# Patient Record
Sex: FEMALE | Race: WHITE | Employment: FULL TIME | URBAN - NONMETROPOLITAN AREA
[De-identification: names, ages, dates, MRNs, and addresses within clinical notes are randomized per-mention and may not be internally consistent; named-entity substitution may affect disease eponyms.]

---

## 2024-07-12 ENCOUNTER — HOSPITAL ENCOUNTER (EMERGENCY)
Age: 44
Discharge: HOME | End: 2024-07-12
Attending: EMERGENCY MEDICINE
Payer: COMMERCIAL

## 2024-07-12 VITALS
SYSTOLIC BLOOD PRESSURE: 119 MMHG | BODY MASS INDEX: 23 KG/M2 | HEIGHT: 62 IN | TEMPERATURE: 98.2 F | OXYGEN SATURATION: 99 % | RESPIRATION RATE: 16 BRPM | DIASTOLIC BLOOD PRESSURE: 82 MMHG | WEIGHT: 125 LBS | HEART RATE: 95 BPM

## 2024-07-12 DIAGNOSIS — R00.2 PALPITATIONS: ICD-10-CM

## 2024-07-12 DIAGNOSIS — E16.2 HYPOGLYCEMIA: Primary | ICD-10-CM

## 2024-07-12 DIAGNOSIS — R03.0 ELEVATED BLOOD PRESSURE READING: ICD-10-CM

## 2024-07-12 DIAGNOSIS — F41.1 ANXIETY STATE: ICD-10-CM

## 2024-07-12 LAB
ALBUMIN SERPL-MCNC: 4 G/DL (ref 3.4–5)
ALBUMIN/GLOB SERPL: 1.4 {RATIO} (ref 1.1–2.2)
ALP SERPL-CCNC: 37 U/L (ref 40–129)
ALT SERPL-CCNC: 15 U/L (ref 10–40)
ANION GAP SERPL CALCULATED.3IONS-SCNC: 12 MMOL/L (ref 3–16)
AST SERPL-CCNC: 21 U/L (ref 15–37)
BASOPHILS # BLD: 0 K/UL (ref 0–0.2)
BASOPHILS NFR BLD: 0.8 %
BILIRUB SERPL-MCNC: <0.2 MG/DL (ref 0–1)
BUN SERPL-MCNC: 17 MG/DL (ref 7–20)
CALCIUM SERPL-MCNC: 9.2 MG/DL (ref 8.3–10.6)
CHLORIDE SERPL-SCNC: 103 MMOL/L (ref 99–110)
CO2 SERPL-SCNC: 23 MMOL/L (ref 21–32)
CREAT SERPL-MCNC: 0.7 MG/DL (ref 0.6–1.1)
D-DIMER QUANTITATIVE: <0.27 UG/ML FEU (ref 0–0.6)
DEPRECATED RDW RBC AUTO: 12.8 % (ref 12.4–15.4)
EKG ATRIAL RATE: 95 BPM
EKG DIAGNOSIS: NORMAL
EKG P AXIS: 53 DEGREES
EKG P-R INTERVAL: 150 MS
EKG Q-T INTERVAL: 354 MS
EKG QRS DURATION: 74 MS
EKG QTC CALCULATION (BAZETT): 444 MS
EKG R AXIS: 62 DEGREES
EKG T AXIS: 39 DEGREES
EKG VENTRICULAR RATE: 95 BPM
EOSINOPHIL # BLD: 0.1 K/UL (ref 0–0.6)
EOSINOPHIL NFR BLD: 1.7 %
GFR SERPLBLD CREATININE-BSD FMLA CKD-EPI: >90 ML/MIN/{1.73_M2}
GLUCOSE BLD-MCNC: 104 MG/DL (ref 70–99)
GLUCOSE BLD-MCNC: 129 MG/DL (ref 70–99)
GLUCOSE SERPL-MCNC: 115 MG/DL (ref 70–99)
HCG SERPL QL: NEGATIVE
HCT VFR BLD AUTO: 36.2 % (ref 36–48)
HGB BLD-MCNC: 12.3 G/DL (ref 12–16)
LACTATE BLDV-SCNC: 1.3 MMOL/L (ref 0.4–2)
LYMPHOCYTES # BLD: 1.6 K/UL (ref 1–5.1)
LYMPHOCYTES NFR BLD: 32.4 %
MAGNESIUM SERPL-MCNC: 2.1 MG/DL (ref 1.8–2.4)
MCH RBC QN AUTO: 30.7 PG (ref 26–34)
MCHC RBC AUTO-ENTMCNC: 33.9 G/DL (ref 31–36)
MCV RBC AUTO: 90.4 FL (ref 80–100)
MONOCYTES # BLD: 0.5 K/UL (ref 0–1.3)
MONOCYTES NFR BLD: 9.9 %
NEUTROPHILS # BLD: 2.7 K/UL (ref 1.7–7.7)
NEUTROPHILS NFR BLD: 55.2 %
PERFORMED ON: ABNORMAL
PERFORMED ON: ABNORMAL
PLATELET # BLD AUTO: 378 K/UL (ref 135–450)
PMV BLD AUTO: 6.9 FL (ref 5–10.5)
POTASSIUM SERPL-SCNC: 4.1 MMOL/L (ref 3.5–5.1)
PROT SERPL-MCNC: 6.9 G/DL (ref 6.4–8.2)
RBC # BLD AUTO: 4.01 M/UL (ref 4–5.2)
SODIUM SERPL-SCNC: 138 MMOL/L (ref 136–145)
TROPONIN, HIGH SENSITIVITY: <6 NG/L (ref 0–14)
WBC # BLD AUTO: 4.9 K/UL (ref 4–11)

## 2024-07-12 PROCEDURE — 36415 COLL VENOUS BLD VENIPUNCTURE: CPT

## 2024-07-12 PROCEDURE — 99284 EMERGENCY DEPT VISIT MOD MDM: CPT

## 2024-07-12 PROCEDURE — 2580000003 HC RX 258: Performed by: EMERGENCY MEDICINE

## 2024-07-12 PROCEDURE — 85025 COMPLETE CBC W/AUTO DIFF WBC: CPT

## 2024-07-12 PROCEDURE — 83735 ASSAY OF MAGNESIUM: CPT

## 2024-07-12 PROCEDURE — 85379 FIBRIN DEGRADATION QUANT: CPT

## 2024-07-12 PROCEDURE — 84703 CHORIONIC GONADOTROPIN ASSAY: CPT

## 2024-07-12 PROCEDURE — 93010 ELECTROCARDIOGRAM REPORT: CPT | Performed by: INTERNAL MEDICINE

## 2024-07-12 PROCEDURE — 96360 HYDRATION IV INFUSION INIT: CPT

## 2024-07-12 PROCEDURE — 83605 ASSAY OF LACTIC ACID: CPT

## 2024-07-12 PROCEDURE — 93005 ELECTROCARDIOGRAM TRACING: CPT | Performed by: EMERGENCY MEDICINE

## 2024-07-12 PROCEDURE — 80053 COMPREHEN METABOLIC PANEL: CPT

## 2024-07-12 PROCEDURE — 84484 ASSAY OF TROPONIN QUANT: CPT

## 2024-07-12 RX ORDER — M-VIT,TX,IRON,MINS/CALC/FOLIC 27MG-0.4MG
1 TABLET ORAL DAILY
COMMUNITY

## 2024-07-12 RX ORDER — 0.9 % SODIUM CHLORIDE 0.9 %
1000 INTRAVENOUS SOLUTION INTRAVENOUS ONCE
Status: COMPLETED | OUTPATIENT
Start: 2024-07-12 | End: 2024-07-12

## 2024-07-12 RX ORDER — METHYLPHENIDATE HYDROCHLORIDE 5 MG/1
5 TABLET ORAL 2 TIMES DAILY
COMMUNITY

## 2024-07-12 RX ADMIN — SODIUM CHLORIDE 1000 ML: 9 INJECTION, SOLUTION INTRAVENOUS at 01:45

## 2024-07-12 ASSESSMENT — PAIN SCALES - GENERAL
PAINLEVEL_OUTOF10: 0
PAINLEVEL_OUTOF10: 0

## 2024-07-12 ASSESSMENT — PAIN - FUNCTIONAL ASSESSMENT
PAIN_FUNCTIONAL_ASSESSMENT: 0-10
PAIN_FUNCTIONAL_ASSESSMENT: 0-10

## 2024-07-12 ASSESSMENT — ENCOUNTER SYMPTOMS
SHORTNESS OF BREATH: 0
DIARRHEA: 0
VOMITING: 0
ABDOMINAL PAIN: 0

## 2024-07-12 NOTE — DISCHARGE INSTRUCTIONS
Stay hydrated.  Talk to your primary doctor about potentially switching from Trintellix since this can cause hypoglycemia.  If you have symptoms like this in the future, you were smart to do what you did this evening by eating a sugary snack.      Return to the emergency department for any return of low glucose, development of chest pain or shortness of breath, new fever or concern for infection, or any other concerns over the next 6 to 24 hours.    Follow-up with your primary doctor in 2 to 4 days for further assessment.

## 2024-07-12 NOTE — ED PROVIDER NOTES
[facility] EMERGENCY DEPARTMENT  EMERGENCY DEPARTMENT ENCOUNTER        Pt Name: Sarahi Cosme  MRN: [id number]  Birthdate 1980  Date of evaluation: 7/12/2024  Provider: Rodriguez Casas MD  PCP: Lefty López, APRN - CNP      CHIEF COMPLAINT       Chief Complaint   Patient presents with    Hypoglycemia     Pt reports that she is pre-diabetic, she felt shaky and tachycardic. Pt checked her BS her meter read \"low.\" Pt took apple juice and had candy bar prior to arrival.  during triage       HISTORY OFPRESENT ILLNESS   (Location/Symptom, Timing/Onset, Context/Setting, Quality, Duration, Modifying Factors,Severity)  Note limiting factors.     Sarahi Cosme is a 43 y.o. female presenting today due to concern for starting to not feel well around 12:30 AM this morning about 1 hour prior to me seeing her associated with feeling cold all over and shaky.  She also felt like her heart was racing.  She checked her glucose after googling her symptoms and ultimately her meter read low which she has never seen before.  She states she has had somewhat low blood sugars in the past but never had a reading where it just read low multiple times.  She does feel anxious right now but denies any reason to be more anxious this evening.  She did drink some apple juice and had a candy bar after checking her sugar and by the time she got to the emergency department her glucose measured 129.  She denies any chest pain or shortness of breath.  No abdominal pain or vomiting or diarrhea.  No fever.  She is on oral birth control.  She is not on any medications that would cause low blood sugar.  She states she tries to avoid carbohydrates when possible.  She ate around 6 PM and had a normal meal.  Due to concern for hypoglycemia, she came to the emergency department for further evaluation.  She denied any trouble walking back to her emergency medicine bed prior to me seeing her and denies feeling lightheaded or dizzy  currently.    She does report starting Trintellix over the last month and that is the only new change in medications.    REVIEW OF SYSTEMS    (2-9 systems for level 4, 10 or more for level 5)     Review of Systems   Constitutional:  Positive for chills and fatigue. Negative for appetite change, fever and unexpected weight change.   Respiratory:  Negative for shortness of breath.    Cardiovascular:  Positive for palpitations. Negative for chest pain.   Gastrointestinal:  Negative for abdominal pain, diarrhea and vomiting.   Genitourinary:  Negative for vaginal bleeding (Last menstrual cycle was 2 weeks ago).   Musculoskeletal:  Negative for gait problem.   Neurological:  Negative for dizziness, syncope, weakness, light-headedness and numbness.   Psychiatric/Behavioral:  Negative for suicidal ideas. The patient is nervous/anxious.          Positives and Pertinent negatives as per HPI.      PASTMEDICAL HISTORY     Past Medical History:   Diagnosis Date    Abnormal Pap smear of cervix     Advanced maternal age in multigravida     37 yo at time of delivery    Anemia     takes iron BID    Diabetes mellitus (HCC)     gestational, diet controlled    History of blood transfusion     Infertility, female     Pre-eclampsia     with G1    Urinary tract infection     reoccuring         SURGICAL HISTORY       Past Surgical History:   Procedure Laterality Date     SECTION N/A 3/9/2019     SECTION performed by Sabas Ricketts MD at [facility] L&D OR    CHOLECYSTECTOMY, LAPAROSCOPIC N/A 2020    LAPAROSCOPIC CHOLECYSTECTOMY WITH INTRAOPERATIVE CHOLANGIOGRAM performed by Sebas Colorado MD at [facility] OR    OTHER SURGICAL HISTORY      surgery for uterine infection postop delivery of child; 21 years ago    WISDOM TOOTH EXTRACTION           CURRENT MEDICATIONS       Discharge Medication List as of 2024  2:49 AM        CONTINUE these medications which have NOT CHANGED    Details   Multiple Vitamins-Minerals

## 2025-06-24 ENCOUNTER — APPOINTMENT (OUTPATIENT)
Dept: GENERAL RADIOLOGY | Age: 45
End: 2025-06-24
Payer: COMMERCIAL

## 2025-06-24 ENCOUNTER — HOSPITAL ENCOUNTER (EMERGENCY)
Age: 45
Discharge: HOME | End: 2025-06-24
Payer: COMMERCIAL

## 2025-06-24 VITALS
HEIGHT: 62 IN | SYSTOLIC BLOOD PRESSURE: 115 MMHG | WEIGHT: 150 LBS | BODY MASS INDEX: 27.6 KG/M2 | OXYGEN SATURATION: 98 % | TEMPERATURE: 98 F | RESPIRATION RATE: 18 BRPM | DIASTOLIC BLOOD PRESSURE: 68 MMHG | HEART RATE: 111 BPM

## 2025-06-24 DIAGNOSIS — S82.61XA CLOSED AVULSION FRACTURE OF LATERAL MALLEOLUS OF RIGHT FIBULA, INITIAL ENCOUNTER: Primary | ICD-10-CM

## 2025-06-24 PROCEDURE — 99283 EMERGENCY DEPT VISIT LOW MDM: CPT

## 2025-06-24 PROCEDURE — 73610 X-RAY EXAM OF ANKLE: CPT

## 2025-06-24 ASSESSMENT — PAIN - FUNCTIONAL ASSESSMENT: PAIN_FUNCTIONAL_ASSESSMENT: 0-10

## 2025-06-24 ASSESSMENT — PAIN SCALES - GENERAL: PAINLEVEL_OUTOF10: 5

## 2025-06-24 NOTE — DISCHARGE INSTRUCTIONS
You were seen today for a right ankle injury.  Your x-ray did show a avulsion fracture of the lateral malleolus.  You were placed in a walking boot provided with crutches.  A referral was given to you for orthopedics, please remain nonweightbearing and to follow-up with orthopedics for reevaluation.  Please rest, ice, and elevate the affected foot.  You can take ibuprofen 600 mg every 6 hours and Tylenol 1000 mg every 8 hours as needed for symptom relief.  Return as department for any new or worsening symptoms including uncontrolled pain with medication, increased swelling, numbness.

## 2025-06-24 NOTE — ED NOTES
Discharge paperwork given to and reviewed with pt. Pt verbalized understanding and all questions answered. Pt encouraged to return if having worsening symptoms or new symptoms discussed in discharge paperwork.  Pt to follow up with Ortho   Pt in NAD, RR even and unlabored. Pt off unit ambulatory with crutches

## 2025-06-24 NOTE — ED PROVIDER NOTES
[facility] EMERGENCY DEPARTMENT  EMERGENCY DEPARTMENT ENCOUNTER        Pt Name: Sarahi Cosme  MRN: [id number]  Birthdate 1980  Date of evaluation: 2025  Provider: RAMONA Wise  PCP: Lefty López, APRN - CNP  Note Started: 3:37 PM EDT 25      ELIZABETH. I have evaluated this patient.        CHIEF COMPLAINT       Chief Complaint   Patient presents with    Ankle Pain     Pt reports right ankle pain after getting out of work van and rolling ankle on curb today       HISTORY OF PRESENT ILLNESS: 1 or more Elements     History From: Patient     Limitations to history : None    Social Determinants Significantly Affecting Health : None    Chief Complaint:Ankle pain     Sarahi Cosme is a 44 y.o. female who presents to the emergency department for right ankle pain.  States that she was stepping out of her work truck when she rolled her ankle on the curb.  She has been able to ambulate since then however pain is getting progressively worse.  Pain is on the lateral aspect of the ankle.  She denies numbness or tingling.  She has not tried taking over-the-counter medications for pain.  Rates her pain a 5 out of 10.    Nursing Notes were all reviewed and agreed with or any disagreements were addressed in the HPI.    REVIEW OF SYSTEMS :      Review of Systems    Positives and Pertinent negatives as per HPI.     SURGICAL HISTORY     Past Surgical History:   Procedure Laterality Date     SECTION N/A 3/9/2019     SECTION performed by Sabas Ricketts MD at [facility] L&D OR    CHOLECYSTECTOMY, LAPAROSCOPIC N/A 2020    LAPAROSCOPIC CHOLECYSTECTOMY WITH INTRAOPERATIVE CHOLANGIOGRAM performed by Sebas Colorado MD at [facility] OR    OTHER SURGICAL HISTORY      surgery for uterine infection postop delivery of child; 21 years ago    WISDOM TOOTH EXTRACTION         CURRENTMEDICATIONS       Discharge Medication List as of 2025  4:41 PM        CONTINUE these medications which have NOT

## 2025-06-25 ENCOUNTER — TELEPHONE (OUTPATIENT)
Dept: ORTHOPEDIC SURGERY | Age: 45
End: 2025-06-25

## 2025-06-25 NOTE — TELEPHONE ENCOUNTER
Spoke with Pt to schedule ED follow up appt. Pt was provided with time, date and location. Pt expressed understanding

## 2025-06-25 NOTE — TELEPHONE ENCOUNTER
----- Message from Dr. Sathya Rouse MD sent at 6/25/2025  7:17 AM EDT -----  Schedule for tomorrow 6/26/25  ----- Message -----  From: Discharge Provider, Automatic  Sent: 6/25/2025   4:47 AM EDT  To: Sathya Rouse MD

## 2025-06-26 ENCOUNTER — OFFICE VISIT (OUTPATIENT)
Dept: ORTHOPEDIC SURGERY | Age: 45
End: 2025-06-26
Payer: COMMERCIAL

## 2025-06-26 VITALS — BODY MASS INDEX: 27.6 KG/M2 | WEIGHT: 150 LBS | HEIGHT: 62 IN

## 2025-06-26 DIAGNOSIS — S82.831A CLOSED FRACTURE OF DISTAL END OF RIGHT FIBULA, UNSPECIFIED FRACTURE MORPHOLOGY, INITIAL ENCOUNTER: Primary | ICD-10-CM

## 2025-06-26 PROCEDURE — 99204 OFFICE O/P NEW MOD 45 MIN: CPT | Performed by: ORTHOPAEDIC SURGERY

## 2025-06-26 PROCEDURE — G8419 CALC BMI OUT NRM PARAM NOF/U: HCPCS | Performed by: ORTHOPAEDIC SURGERY

## 2025-06-26 PROCEDURE — G8427 DOCREV CUR MEDS BY ELIG CLIN: HCPCS | Performed by: ORTHOPAEDIC SURGERY

## 2025-06-26 PROCEDURE — 4004F PT TOBACCO SCREEN RCVD TLK: CPT | Performed by: ORTHOPAEDIC SURGERY

## 2025-06-26 RX ORDER — MELOXICAM 15 MG/1
15 TABLET ORAL DAILY PRN
Qty: 30 TABLET | Refills: 0 | Status: SHIPPED | OUTPATIENT
Start: 2025-06-26

## 2025-06-26 NOTE — PROGRESS NOTES
ORTHOPAEDIC SURGERY H&P / CONSULTATION NOTE    Chief complaint:   Chief Complaint   Patient presents with    Ankle Pain     NP R ANKLE      History of present illness: The patient is a 44 y.o. female with subjective symptoms of right ankle pain. The chief complaint is located at anterolateral aspect/lateral aspect of the ankle. Duration of symptoms has been for 2 days. The severity of symptoms is rated at 4/10 pain but can be as high as 6/10 pain on intake form.  Patient states she stepped out of a truck on 2025 and rolled her ankle.  She went to the ER on 2025.  She states she is noticed swelling.  She was provided a boot and crutches.  She has been taking over-the-counter anti-inflammatory/medications with mild relief.  Denies therapy or injections.  States no significant injuries to the ankle previously.  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     +   Over-the-counter anti-inflammatories/prescription medication anti-inflammatory.     -   Physical therapy / guided home exercise program -     -   Previous corticosteroid injections    Past medical history:    Past Medical History:   Diagnosis Date    Abnormal Pap smear of cervix     Advanced maternal age in multigravida     37 yo at time of delivery    Anemia     takes iron BID    Diabetes mellitus (HCC)     gestational, diet controlled    History of blood transfusion     Infertility, female     Pre-eclampsia     with G1    Urinary tract infection     reoccuring        Past surgical history:    Past Surgical History:   Procedure Laterality Date     SECTION N/A 3/9/2019     SECTION performed by Sabas Ricketts MD at [facility] L&D OR    CHOLECYSTECTOMY, LAPAROSCOPIC N/A 2020    LAPAROSCOPIC CHOLECYSTECTOMY WITH INTRAOPERATIVE CHOLANGIOGRAM performed by Sebas Canas

## 2025-07-01 ENCOUNTER — TELEPHONE (OUTPATIENT)
Dept: ORTHOPEDIC SURGERY | Age: 45
End: 2025-07-01

## 2025-07-01 NOTE — TELEPHONE ENCOUNTER
CALLED TO SPEAK WITH PATIEN IN REGARDS TO COLLECTING WC INFORMATION BUT WAS UNSUCCESSFUL. UNABLE TO LEAVE ,  IS FULL.

## 2025-07-03 ENCOUNTER — OFFICE VISIT (OUTPATIENT)
Dept: ORTHOPEDIC SURGERY | Age: 45
End: 2025-07-03
Payer: COMMERCIAL

## 2025-07-03 VITALS — HEIGHT: 62 IN | BODY MASS INDEX: 27.6 KG/M2 | WEIGHT: 150 LBS

## 2025-07-03 DIAGNOSIS — E55.9 VITAMIN D DEFICIENCY: ICD-10-CM

## 2025-07-03 DIAGNOSIS — S82.831D CLOSED FRACTURE OF DISTAL END OF RIGHT FIBULA WITH ROUTINE HEALING, UNSPECIFIED FRACTURE MORPHOLOGY, SUBSEQUENT ENCOUNTER: Primary | ICD-10-CM

## 2025-07-03 LAB — 25(OH)D3 SERPL-MCNC: 50.2 NG/ML

## 2025-07-03 PROCEDURE — 4004F PT TOBACCO SCREEN RCVD TLK: CPT | Performed by: ORTHOPAEDIC SURGERY

## 2025-07-03 PROCEDURE — 99213 OFFICE O/P EST LOW 20 MIN: CPT | Performed by: ORTHOPAEDIC SURGERY

## 2025-07-03 PROCEDURE — G8419 CALC BMI OUT NRM PARAM NOF/U: HCPCS | Performed by: ORTHOPAEDIC SURGERY

## 2025-07-03 PROCEDURE — G8427 DOCREV CUR MEDS BY ELIG CLIN: HCPCS | Performed by: ORTHOPAEDIC SURGERY

## 2025-07-08 ENCOUNTER — RESULTS FOLLOW-UP (OUTPATIENT)
Dept: ORTHOPEDIC SURGERY | Age: 45
End: 2025-07-08

## 2025-07-08 NOTE — TELEPHONE ENCOUNTER
----- Message from Dr. Sathya Rouse MD sent at 7/6/2025 10:44 AM EDT -----  Regarding: FW:  Please contact patient.  Normal lab work  ----- Message -----  From: Maura Incoming Lab Results From Soft (Epic Adt)  Sent: 7/3/2025   9:45 PM EDT  To: Sathya Rouse MD

## 2025-07-10 NOTE — TELEPHONE ENCOUNTER
Spoke with Pt to provide normal Vit D TR and no need for Vit D supplementation at this time. Recommended for diet rich in calcium. Pt expressed understanding

## 2025-07-14 ENCOUNTER — TELEPHONE (OUTPATIENT)
Dept: ORTHOPEDIC SURGERY | Age: 45
End: 2025-07-14

## 2025-07-14 NOTE — TELEPHONE ENCOUNTER
Attempted to contact Pt to inform them of the approval of the C9 for physical therapy. M for Pt to call back at [phone]

## 2025-07-16 ENCOUNTER — TELEPHONE (OUTPATIENT)
Dept: ORTHOPEDIC SURGERY | Age: 45
End: 2025-07-16

## 2025-07-16 NOTE — TELEPHONE ENCOUNTER
Attempted to call Pt to let her know that her C9 for physical therapy had been approved. Did not answer so LVM relaying and asked that she call back at [phone] if she had any questions.

## 2025-07-17 ENCOUNTER — TELEPHONE (OUTPATIENT)
Dept: ORTHOPEDIC SURGERY | Age: 45
End: 2025-07-17

## 2025-07-17 NOTE — TELEPHONE ENCOUNTER
Attempted to contact Pt to inform them of C9 approval for physical therapy. LVM for Pt to call back at [phone]

## 2025-07-21 DIAGNOSIS — S82.831A CLOSED FRACTURE OF DISTAL END OF RIGHT FIBULA, UNSPECIFIED FRACTURE MORPHOLOGY, INITIAL ENCOUNTER: ICD-10-CM

## 2025-07-21 RX ORDER — MELOXICAM 15 MG/1
TABLET ORAL
Qty: 30 TABLET | Refills: 0 | Status: SHIPPED | OUTPATIENT
Start: 2025-07-21

## 2025-07-30 ENCOUNTER — HOSPITAL ENCOUNTER (OUTPATIENT)
Dept: PHYSICAL THERAPY | Age: 45
Setting detail: THERAPIES SERIES
Discharge: HOME | End: 2025-07-30
Payer: COMMERCIAL

## 2025-07-30 DIAGNOSIS — R26.89 ANTALGIC GAIT: ICD-10-CM

## 2025-07-30 DIAGNOSIS — S82.821A CLOSED TORUS FRACTURE OF DISTAL END OF RIGHT FIBULA, INITIAL ENCOUNTER: ICD-10-CM

## 2025-07-30 DIAGNOSIS — M25.571 ACUTE RIGHT ANKLE PAIN: Primary | ICD-10-CM

## 2025-07-30 PROCEDURE — 97140 MANUAL THERAPY 1/> REGIONS: CPT

## 2025-07-30 PROCEDURE — 97161 PT EVAL LOW COMPLEX 20 MIN: CPT

## 2025-07-30 PROCEDURE — 97110 THERAPEUTIC EXERCISES: CPT

## 2025-07-30 NOTE — PLAN OF CARE
antalgic pattern, decreased lópez, lack heel strike on right, decreased step height on right, decreased step length on right, and decreased stance time on right  Assistive Device Used: walking boot    Balance:   NT    Falls Risk Assessment (30 days):   Falls Risk assessed and no intervention required.  Time Up and Go (TUG):   Not Assessed        Exercises/Interventions     Therapeutic Ex (19133)   NMR re-education (07894) resistance Sets/time Reps Notes/Cues/Progressions          Long Sit Gastroc Stretch   30\" 3 x     Long Sit Bent Knee Soleus stretch  30\" 3 x            Long Sit PF/DF   20 x ea    Long Sit Ever/Inver   20 x ea           Seated Towel Toe Curls   30 x     Seated PF/DF  5\" 20 x ea    Seated Ever/Inver   20 x ea           1/2 Moon PF/DF Stretch  10\" 15 x                                               Manual Intervention (20383)  TIME     Metetarsal Mobs  5'     PROM Ankle  7'     Ankle Mobs   5'     Manual Great Toe Stretching  3'                   Therapeutic Activity (17416)  Sets/time                                          Modalities:    No modalities applied this session    Education/Home Exercise Program: Patient HEP program created electronically.  Refer to [employer] access code: Access Code: [email or web address]  Access Code: [email or web address]  URL: [email or web address]  Date: 07/30/2025  Prepared by: Melissa Altman    Exercises  - Long Sitting Calf Stretch with Strap  - 1 x daily - 7 x weekly - 1 sets - 5 reps - 30 hold  - Long Sitting Soleus Stretch on Bolster with Strap  - 1 x daily - 7 x weekly - 1 sets - 3 reps - 30\" hold  - Seated Table Hamstring Stretch  - 1 x daily - 7 x weekly - 1 sets - 5 reps - 30 hold  - Seated Toe Raise  - 1 x daily - 7 x weekly - 1 sets - 15 reps - 5\" hold  - Seated Heel Raise  - 1 x daily - 7 x weekly - 1 sets - 15 reps - 5\" hold  - Ankle Inversion Eversion Towel Slide  - 1 x daily - 7 x weekly - 2 sets - 15 reps  - Towel Scrunches  - 1 x daily - 7 x weekly - 1 sets - 30

## 2025-07-31 ENCOUNTER — OFFICE VISIT (OUTPATIENT)
Dept: ORTHOPEDIC SURGERY | Age: 45
End: 2025-07-31

## 2025-07-31 VITALS — HEIGHT: 62 IN | BODY MASS INDEX: 27.6 KG/M2 | WEIGHT: 150 LBS

## 2025-07-31 DIAGNOSIS — S82.831D CLOSED FRACTURE OF DISTAL END OF RIGHT FIBULA WITH ROUTINE HEALING, UNSPECIFIED FRACTURE MORPHOLOGY, SUBSEQUENT ENCOUNTER: Primary | ICD-10-CM

## 2025-07-31 NOTE — PROGRESS NOTES
FOLLOW UP ORTHOPAEDIC NOTE    The patient follows up today for re-evaluation of right ankle. The patient states 1/10 pain.  She just darted therapy.  She has been walking around in the boot.  She denies significant pain.    PE:  AAOx3  RR  Unlabored breathing  Skin warm and moist  Focused physical examination of the right ankle  NTTP distal lateral malleolus  Resolved swelling  Skin intact throughout  5/5 IP Q H TA G EHL  SILT DP SP LP MP S S  +2 DP pulse    Pertinent radiographs/imaging:  3V R ankle 7/31/2025: Healed distal fibula fracture     Diagnosis Orders   1. Closed fracture of distal end of right fibula with routine healing, unspecified fracture morphology, subsequent encounter  XR ANKLE RIGHT (MIN 3 VIEWS)        Assessment and plan: 44 female with continued subjective symptoms of right ankle pain with known, correlating diagnosis of right distal fibula fracture.  -Time of 12 minutes was spent coordinating and discussing the clinical findings and diagnostic imaging results as they pertain to the patient's presenting subjective symptoms.  - I had a pleasant discussion with the patient today.  I reviewed with the patient that currently her clinical examination is improving and her radiographs show healed distal fibula fracture  - She will transition out of the boot at this time to regular shoe use however I recommended a supportive pair of shoes  - She will continue to work on agustín-ankle reconditioning and strengthening to include pain modalities with formal physical therapy for the next several sessions  - OTC Tylenol/anti-inflammatories previously reviewed and discussed  - I did review with her overall expectation management as she continues to recover  -Note was provided for work stating may return to work without limitations  -All questions answered to the patient's satisfaction and the patient expressed understanding and agreement with the above listed treatment plan  -Follow up in as needed  -Thank you

## 2025-08-01 ENCOUNTER — HOSPITAL ENCOUNTER (OUTPATIENT)
Dept: PHYSICAL THERAPY | Age: 45
Setting detail: THERAPIES SERIES
Discharge: HOME | End: 2025-08-01
Payer: COMMERCIAL

## 2025-08-01 PROCEDURE — 97140 MANUAL THERAPY 1/> REGIONS: CPT

## 2025-08-01 PROCEDURE — 97110 THERAPEUTIC EXERCISES: CPT

## 2025-08-01 PROCEDURE — 97112 NEUROMUSCULAR REEDUCATION: CPT

## 2025-08-01 NOTE — FLOWSHEET NOTE
[facility] - Outpatient Rehabilitation and Therapy: [address], [place] [address] office: [phone] fax: [phone]    Physical Therapy: TREATMENT/PROGRESS NOTE   Patient: Sarahi Cosme (44 y.o. female)   Examination Date: 2025   :  1980 MRN: [id number]   Visit #: 2   Insurance Allowable Auth Needed   12 visits  2-3 x wk for 4-6 wks (25 end date) [x]Yes    []No    Insurance: Payor: [employer] (Inspect Manager)O / Plan: CMP.LY MCO / Product Type: *No Product type* /   Insurance ID: [id number] - (Worker's Comp)  Secondary Insurance (if applicable):    Treatment Diagnosis:     ICD-10-CM    1. Acute right ankle pain  M25.571       2. Antalgic gait  R26.89       3. Closed torus fracture of distal end of right fibula, initial encounter  S82.821A          Medical Diagnosis: S82.831A Closed torus fracture of distal end of right fibula, initial encounter      Referring Physician: Sathya Rouse MD  PCP: Lefty López, APRN - CNP     Plan of care signed (Y/N):     Date of Patient follow up with Physician:      Plan of Care Report: NO  POC update due: (10 visits /OR AUTH LIMITS, whichever is less)  2025                                             Medical History:  Comorbidities:  Anxiety  Depression  Relevant Medical History:   Past Medical History:   Diagnosis Date    Abnormal Pap smear of cervix     Advanced maternal age in multigravida     39 yo at time of delivery    Anemia     takes iron BID    Diabetes mellitus (HCC)     gestational, diet controlled    History of blood transfusion     Infertility, female     Pre-eclampsia     with G1    Urinary tract infection     reoccuring                                              Precautions/ Contra-indications:           Latex allergy:  NO  Pacemaker:    NO  Contraindications for Manipulation: recent fracture  Date of Surgery: n/a  Other:    Red Flags:  None    Suicide Screening:   The patient did not verbalize a primary

## 2025-08-05 ENCOUNTER — HOSPITAL ENCOUNTER (OUTPATIENT)
Dept: PHYSICAL THERAPY | Age: 45
Setting detail: THERAPIES SERIES
Discharge: HOME | End: 2025-08-05
Payer: COMMERCIAL

## 2025-08-05 PROCEDURE — 97112 NEUROMUSCULAR REEDUCATION: CPT

## 2025-08-05 PROCEDURE — 97110 THERAPEUTIC EXERCISES: CPT

## 2025-08-05 PROCEDURE — 97140 MANUAL THERAPY 1/> REGIONS: CPT

## 2025-08-08 ENCOUNTER — HOSPITAL ENCOUNTER (OUTPATIENT)
Dept: PHYSICAL THERAPY | Age: 45
Setting detail: THERAPIES SERIES
Discharge: HOME | End: 2025-08-08
Payer: COMMERCIAL

## 2025-08-08 PROCEDURE — 97110 THERAPEUTIC EXERCISES: CPT

## 2025-08-08 PROCEDURE — 97112 NEUROMUSCULAR REEDUCATION: CPT

## 2025-08-08 PROCEDURE — 97140 MANUAL THERAPY 1/> REGIONS: CPT

## 2025-08-12 ENCOUNTER — HOSPITAL ENCOUNTER (OUTPATIENT)
Dept: PHYSICAL THERAPY | Age: 45
Setting detail: THERAPIES SERIES
Discharge: HOME | End: 2025-08-12
Payer: COMMERCIAL

## 2025-08-12 PROCEDURE — 97112 NEUROMUSCULAR REEDUCATION: CPT

## 2025-08-12 PROCEDURE — 97110 THERAPEUTIC EXERCISES: CPT

## 2025-08-12 PROCEDURE — 97161 PT EVAL LOW COMPLEX 20 MIN: CPT

## 2025-08-12 PROCEDURE — 97140 MANUAL THERAPY 1/> REGIONS: CPT

## 2025-08-14 ENCOUNTER — HOSPITAL ENCOUNTER (OUTPATIENT)
Dept: PHYSICAL THERAPY | Age: 45
Setting detail: THERAPIES SERIES
Discharge: HOME | End: 2025-08-14
Payer: COMMERCIAL

## 2025-08-14 PROCEDURE — 97112 NEUROMUSCULAR REEDUCATION: CPT

## 2025-08-14 PROCEDURE — 97140 MANUAL THERAPY 1/> REGIONS: CPT

## 2025-08-14 PROCEDURE — 97110 THERAPEUTIC EXERCISES: CPT

## 2025-08-19 ENCOUNTER — HOSPITAL ENCOUNTER (OUTPATIENT)
Dept: PHYSICAL THERAPY | Age: 45
Setting detail: THERAPIES SERIES
Discharge: HOME | End: 2025-08-19
Payer: COMMERCIAL

## 2025-08-19 ENCOUNTER — PATIENT MESSAGE (OUTPATIENT)
Dept: ORTHOPEDIC SURGERY | Age: 45
End: 2025-08-19

## 2025-08-19 PROCEDURE — 97140 MANUAL THERAPY 1/> REGIONS: CPT

## 2025-08-19 PROCEDURE — 97112 NEUROMUSCULAR REEDUCATION: CPT

## 2025-08-19 PROCEDURE — 97110 THERAPEUTIC EXERCISES: CPT

## 2025-08-22 ENCOUNTER — HOSPITAL ENCOUNTER (OUTPATIENT)
Dept: PHYSICAL THERAPY | Age: 45
Setting detail: THERAPIES SERIES
Discharge: HOME | End: 2025-08-22
Payer: COMMERCIAL

## 2025-08-22 PROCEDURE — 97140 MANUAL THERAPY 1/> REGIONS: CPT

## 2025-08-22 PROCEDURE — 97110 THERAPEUTIC EXERCISES: CPT

## 2025-08-22 PROCEDURE — 97112 NEUROMUSCULAR REEDUCATION: CPT

## 2025-08-26 ENCOUNTER — HOSPITAL ENCOUNTER (OUTPATIENT)
Dept: PHYSICAL THERAPY | Age: 45
Setting detail: THERAPIES SERIES
Discharge: HOME | End: 2025-08-26
Payer: COMMERCIAL

## 2025-08-26 PROCEDURE — 97112 NEUROMUSCULAR REEDUCATION: CPT

## 2025-08-26 PROCEDURE — 97110 THERAPEUTIC EXERCISES: CPT

## 2025-08-26 PROCEDURE — 97140 MANUAL THERAPY 1/> REGIONS: CPT

## 2025-08-28 ENCOUNTER — HOSPITAL ENCOUNTER (OUTPATIENT)
Dept: PHYSICAL THERAPY | Age: 45
Setting detail: THERAPIES SERIES
Discharge: HOME | End: 2025-08-28
Payer: COMMERCIAL

## 2025-08-28 ENCOUNTER — OFFICE VISIT (OUTPATIENT)
Dept: ORTHOPEDIC SURGERY | Age: 45
End: 2025-08-28

## 2025-08-28 VITALS — BODY MASS INDEX: 27.6 KG/M2 | WEIGHT: 150 LBS | HEIGHT: 62 IN

## 2025-08-28 DIAGNOSIS — S82.831D CLOSED FRACTURE OF DISTAL END OF RIGHT FIBULA WITH ROUTINE HEALING, UNSPECIFIED FRACTURE MORPHOLOGY, SUBSEQUENT ENCOUNTER: Primary | ICD-10-CM

## 2025-08-28 PROCEDURE — 97140 MANUAL THERAPY 1/> REGIONS: CPT

## 2025-08-28 PROCEDURE — 97112 NEUROMUSCULAR REEDUCATION: CPT

## 2025-08-28 PROCEDURE — 97110 THERAPEUTIC EXERCISES: CPT

## 2025-08-28 RX ORDER — MELOXICAM 15 MG/1
15 TABLET ORAL DAILY PRN
Qty: 30 TABLET | Refills: 0 | Status: SHIPPED | OUTPATIENT
Start: 2025-08-28

## 2025-09-03 ENCOUNTER — HOSPITAL ENCOUNTER (OUTPATIENT)
Dept: PHYSICAL THERAPY | Age: 45
Setting detail: THERAPIES SERIES
Discharge: HOME | End: 2025-09-03
Payer: COMMERCIAL

## 2025-09-03 PROCEDURE — 97110 THERAPEUTIC EXERCISES: CPT

## 2025-09-03 PROCEDURE — 97112 NEUROMUSCULAR REEDUCATION: CPT

## 2025-09-03 PROCEDURE — 97140 MANUAL THERAPY 1/> REGIONS: CPT

## 2025-09-05 ENCOUNTER — HOSPITAL ENCOUNTER (OUTPATIENT)
Dept: PHYSICAL THERAPY | Age: 45
Setting detail: THERAPIES SERIES
Discharge: HOME | End: 2025-09-05
Payer: COMMERCIAL

## 2025-09-05 PROCEDURE — 97110 THERAPEUTIC EXERCISES: CPT | Performed by: PHYSICAL THERAPY ASSISTANT

## 2025-09-05 PROCEDURE — 97140 MANUAL THERAPY 1/> REGIONS: CPT | Performed by: PHYSICAL THERAPY ASSISTANT

## 2025-09-05 PROCEDURE — 98967 PH1 ASSMT&MGMT NQHP 11-20: CPT | Performed by: PHYSICAL THERAPY ASSISTANT
